# Patient Record
Sex: FEMALE | Race: WHITE
[De-identification: names, ages, dates, MRNs, and addresses within clinical notes are randomized per-mention and may not be internally consistent; named-entity substitution may affect disease eponyms.]

---

## 2017-04-13 ENCOUNTER — HOSPITAL ENCOUNTER (EMERGENCY)
Dept: HOSPITAL 1 - ED | Age: 52
Discharge: HOME | End: 2017-04-13
Payer: COMMERCIAL

## 2017-04-13 VITALS — DIASTOLIC BLOOD PRESSURE: 68 MMHG | SYSTOLIC BLOOD PRESSURE: 111 MMHG
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
## 2017-04-13 DIAGNOSIS — S93.401A: Primary | ICD-10-CM

## 2020-04-27 ENCOUNTER — HOSPITAL ENCOUNTER (OUTPATIENT)
Dept: HOSPITAL 1 - US | Age: 55
Discharge: HOME | End: 2020-04-27
Payer: COMMERCIAL

## 2020-04-27 DIAGNOSIS — N63.0: Primary | ICD-10-CM

## 2020-04-27 PROCEDURE — BH41ZZZ ULTRASONOGRAPHY OF LEFT BREAST: ICD-10-PCS

## 2021-01-14 ENCOUNTER — HOSPITAL ENCOUNTER (OUTPATIENT)
Dept: HOSPITAL 54 - WOU | Age: 56
Discharge: HOME | End: 2021-01-14
Attending: SURGERY
Payer: COMMERCIAL

## 2021-01-14 DIAGNOSIS — Z17.0: ICD-10-CM

## 2021-01-14 DIAGNOSIS — C50.412: Primary | ICD-10-CM

## 2021-01-14 PROCEDURE — G0463 HOSPITAL OUTPT CLINIC VISIT: HCPCS

## 2021-01-25 ENCOUNTER — HOSPITAL ENCOUNTER (OUTPATIENT)
Dept: HOSPITAL 54 - LAB | Age: 56
Discharge: HOME | End: 2021-01-25
Attending: SURGERY
Payer: COMMERCIAL

## 2021-01-25 DIAGNOSIS — Z01.812: Primary | ICD-10-CM

## 2021-01-25 DIAGNOSIS — Z20.822: ICD-10-CM

## 2021-01-25 PROCEDURE — C9803 HOPD COVID-19 SPEC COLLECT: HCPCS

## 2021-01-25 PROCEDURE — U0003 INFECTIOUS AGENT DETECTION BY NUCLEIC ACID (DNA OR RNA); SEVERE ACUTE RESPIRATORY SYNDROME CORONAVIRUS 2 (SARS-COV-2) (CORONAVIRUS DISEASE [COVID-19]), AMPLIFIED PROBE TECHNIQUE, MAKING USE OF HIGH THROUGHPUT TECHNOLOGIES AS DESCRIBED BY CMS-2020-01-R: HCPCS

## 2021-01-27 ENCOUNTER — HOSPITAL ENCOUNTER (OUTPATIENT)
Dept: HOSPITAL 54 - NM | Age: 56
Discharge: HOME | End: 2021-01-27
Attending: SURGERY
Payer: COMMERCIAL

## 2021-01-27 DIAGNOSIS — C50.912: Primary | ICD-10-CM

## 2021-01-27 PROCEDURE — A9541 TC99M SULFUR COLLOID: HCPCS

## 2021-01-27 PROCEDURE — 78195 LYMPH SYSTEM IMAGING: CPT

## 2021-01-28 ENCOUNTER — HOSPITAL ENCOUNTER (INPATIENT)
Dept: HOSPITAL 54 - DS | Age: 56
LOS: 1 days | Discharge: HOME | DRG: 581 | End: 2021-01-29
Attending: NURSE PRACTITIONER | Admitting: NURSE PRACTITIONER
Payer: COMMERCIAL

## 2021-01-28 VITALS — SYSTOLIC BLOOD PRESSURE: 117 MMHG | DIASTOLIC BLOOD PRESSURE: 80 MMHG

## 2021-01-28 VITALS — DIASTOLIC BLOOD PRESSURE: 90 MMHG | SYSTOLIC BLOOD PRESSURE: 144 MMHG

## 2021-01-28 VITALS — SYSTOLIC BLOOD PRESSURE: 123 MMHG | DIASTOLIC BLOOD PRESSURE: 79 MMHG

## 2021-01-28 VITALS — WEIGHT: 195 LBS | HEIGHT: 62 IN | BODY MASS INDEX: 35.88 KG/M2

## 2021-01-28 DIAGNOSIS — E66.9: ICD-10-CM

## 2021-01-28 DIAGNOSIS — Z92.21: ICD-10-CM

## 2021-01-28 DIAGNOSIS — C50.912: Primary | ICD-10-CM

## 2021-01-28 LAB
BILIRUB UR QL STRIP: NEGATIVE
COLOR UR: YELLOW
GLUCOSE UR STRIP-MCNC: NEGATIVE MG/DL
LEUKOCYTE ESTERASE UR QL STRIP: NEGATIVE
NITRITE UR QL STRIP: NEGATIVE
PH UR STRIP: 5.5 [PH] (ref 5–8)
PROT UR QL STRIP: NEGATIVE MG/DL
UROBILINOGEN UR STRIP-MCNC: 0.2 EU/DL

## 2021-01-28 PROCEDURE — G0378 HOSPITAL OBSERVATION PER HR: HCPCS

## 2021-01-28 PROCEDURE — 07B60ZX EXCISION OF LEFT AXILLARY LYMPHATIC, OPEN APPROACH, DIAGNOSTIC: ICD-10-PCS | Performed by: SURGERY

## 2021-01-28 PROCEDURE — 0HTV0ZZ RESECTION OF BILATERAL BREAST, OPEN APPROACH: ICD-10-PCS | Performed by: SURGERY

## 2021-01-28 RX ADMIN — PANTOPRAZOLE SODIUM SCH MG: 40 TABLET, DELAYED RELEASE ORAL at 17:10

## 2021-01-28 NOTE — NUR
RN CLOSING NOTE



T IS A/O X3 AND COOPERATIVE. PT IS ENGLISH SPEAKING AND IS ABLE TO MAKE NEEDS KNOWN. NO 
COMPLAINTS OF PAIN AT THIS TIME. PT IS CURRENTLY ON 2L NC WITH NO SIGNS OF RESPIRATORY 
DISTRESS PRESENT. PT VITALS STABLE.  PT HAS BATHROOM PRIVILEGES. PT IS ABLE TO AMBULATE 
INDEPENDENTLY. SURGICAL STITCHES PRESENT FROM BILATERAL MASTECTOMY. SAFETY MEASURES 
IMPLEMENTED. SIDE RAILS RAISED. BED LOWERED. CALL LIGHT WITHIN REACH. ROUTINE MEDS GIVEN. 
REPORT GIVEN TO NIGHT NURSE.

## 2021-01-28 NOTE — NUR
RN NOTES

RECEIVED PT. AWAKE ON BED,,A/OX4, AMBULATORY, S/P BILATERAL MASTECTOMY, PT HAS 2 JENN DRAINAGE 
IN PLACE, DRAINING SEROSANGUINEOUS, CALL LIGHT WITHIN REACH, SIDERAILSUPX2, WILL CONTINUE TO 
MONITOR

## 2021-01-28 NOTE — NUR
RN TRANSFER NOTE



RECIEVED PT INTO ROOM 329-1. PT IS A/O X3 AND COOPERATIVE. PT IS ENGLISH SPEAKING AND IS 
ABLE TO MAKE NEEDS KNOWN. NO COMPLAINTS OF PAIN AT THIS TIME. PT IS CURRENTLY ON 2L NC WITH 
NO SIGNS OF RESPIRATORY DISTRESS PRESENT. PT VITALS STABLE. PT HAD 1 BM IN THE MORNING. PT 
VOIDED ONCE. PT IS ABLE TO AMBULATE INDEPENDENTLY. SURGICAL STITCHES PRESENT FROM BILATERAL 
MASTECTOMY. SAFETY MEASURES IMPLEMENTED. SIDE RAILS RAISED. BED LOWERED. CALL LIGHT WITHIN 
REACH. WILL CONTINUE TO MONITOR.

## 2021-01-29 VITALS — SYSTOLIC BLOOD PRESSURE: 106 MMHG | DIASTOLIC BLOOD PRESSURE: 74 MMHG

## 2021-01-29 LAB
BASOPHILS # BLD AUTO: 0 /CMM (ref 0–0.2)
BASOPHILS NFR BLD AUTO: 0.1 % (ref 0–2)
BUN SERPL-MCNC: 10 MG/DL (ref 7–18)
CALCIUM SERPL-MCNC: 8.7 MG/DL (ref 8.5–10.1)
CHLORIDE SERPL-SCNC: 105 MMOL/L (ref 98–107)
CHOLEST SERPL-MCNC: 153 MG/DL (ref ?–200)
CO2 SERPL-SCNC: 26 MMOL/L (ref 21–32)
CREAT SERPL-MCNC: 1 MG/DL (ref 0.6–1.3)
EOSINOPHIL NFR BLD AUTO: 0.5 % (ref 0–6)
GLUCOSE SERPL-MCNC: 93 MG/DL (ref 74–106)
HCT VFR BLD AUTO: 33 % (ref 33–45)
HDLC SERPL-MCNC: 40 MG/DL (ref 40–60)
HGB BLD-MCNC: 10.8 G/DL (ref 11.5–14.8)
LDLC SERPL DIRECT ASSAY-MCNC: 97 MG/DL (ref 0–99)
LYMPHOCYTES NFR BLD AUTO: 1.6 /CMM (ref 0.8–4.8)
LYMPHOCYTES NFR BLD AUTO: 26.4 % (ref 20–44)
MAGNESIUM SERPL-MCNC: 1.8 MG/DL (ref 1.8–2.4)
MCHC RBC AUTO-ENTMCNC: 33 G/DL (ref 31–36)
MCV RBC AUTO: 94 FL (ref 82–100)
MONOCYTES NFR BLD AUTO: 0.6 /CMM (ref 0.1–1.3)
MONOCYTES NFR BLD AUTO: 10 % (ref 2–12)
NEUTROPHILS # BLD AUTO: 3.9 /CMM (ref 1.8–8.9)
NEUTROPHILS NFR BLD AUTO: 63 % (ref 43–81)
PHOSPHATE SERPL-MCNC: 4.4 MG/DL (ref 2.5–4.9)
PLATELET # BLD AUTO: 189 /CMM (ref 150–450)
POTASSIUM SERPL-SCNC: 3.6 MMOL/L (ref 3.5–5.1)
RBC # BLD AUTO: 3.49 MIL/UL (ref 4–5.2)
SODIUM SERPL-SCNC: 141 MMOL/L (ref 136–145)
TRIGL SERPL-MCNC: 126 MG/DL (ref 30–150)
TSH SERPL DL<=0.005 MIU/L-ACNC: 0.69 UIU/ML (ref 0.36–3.74)
WBC NRBC COR # BLD AUTO: 6.2 K/UL (ref 4.3–11)

## 2021-01-29 RX ADMIN — PANTOPRAZOLE SODIUM SCH MG: 40 TABLET, DELAYED RELEASE ORAL at 10:31

## 2021-01-29 NOTE — NUR
MS/RN   Discharge



Patient discharged to home in stable condition.

All personal belongings returned to patient and signed for on belongings list. Educated as 
to how to care for and empty JENN drain, stated understanding and returned demonstration. 
Provided with measuring cups, instructed to record output and to inform MD if no output or 
large volume measured. Provided with prescription for percocet and bactrim, instructed as to 
what each medication was for and possible side effects, patient again stated understanding.

Heplock removed, pressure dressing applied, and name bands removed.

Escorted to main lobby by RN,  provided transport.

## 2021-01-29 NOTE — NUR
RN NOTES

pt. asked for pain meds- pERCOCET 1 TAB PO GIVEN AS ORDERED, V/S STABLE. MORNING CARE 
RENDERED, JENN DRAINAGE IN PLACE AND EMPTIED BOTH , SIDERAILS UPX2, PT. NEEDS ATTENDED

## 2021-01-29 NOTE — NUR
MS/RN   Opening note



Patient received from night shift.

A/O X4, vital signs stable, stating that current pain level is 4/10, given percocet at 6am. 

All questions and concerns addressed, for possible discharge to home later today once 
cleared by Dr Elena.

Call light within reach, bed in low setting, side rails X2 in upright position, brakes 
locked. Will continue to monitor and ensure safety.

## 2021-01-29 NOTE — NUR
MS/RN   S/B Dr Elena



Seen by Dr Elena - patient to be discharged to home today with follow up in office in two 
weeks (02/11/2021). Prescription written and porivded to patient with copy placed in chart.

Exit care prepared, awaiting discharge order from Colby Glass.

## 2021-02-11 ENCOUNTER — HOSPITAL ENCOUNTER (OUTPATIENT)
Dept: HOSPITAL 54 - WOU | Age: 56
Discharge: HOME | End: 2021-02-11
Attending: SURGERY
Payer: COMMERCIAL

## 2021-02-11 DIAGNOSIS — C50.412: ICD-10-CM

## 2021-02-11 DIAGNOSIS — Z17.1: ICD-10-CM

## 2021-02-11 DIAGNOSIS — Z90.13: ICD-10-CM

## 2021-02-11 DIAGNOSIS — Z48.3: Primary | ICD-10-CM

## 2021-02-11 PROCEDURE — G0463 HOSPITAL OUTPT CLINIC VISIT: HCPCS

## 2021-09-21 ENCOUNTER — HOSPITAL ENCOUNTER (OUTPATIENT)
Dept: HOSPITAL 54 - WOU | Age: 56
Discharge: HOME | End: 2021-09-21
Attending: SURGERY
Payer: COMMERCIAL

## 2021-09-21 DIAGNOSIS — C50.412: Primary | ICD-10-CM

## 2021-09-21 DIAGNOSIS — Z90.13: ICD-10-CM

## 2021-09-21 DIAGNOSIS — Z87.891: ICD-10-CM

## 2021-09-21 PROCEDURE — G0463 HOSPITAL OUTPT CLINIC VISIT: HCPCS
